# Patient Record
Sex: FEMALE | Race: WHITE | URBAN - NONMETROPOLITAN AREA
[De-identification: names, ages, dates, MRNs, and addresses within clinical notes are randomized per-mention and may not be internally consistent; named-entity substitution may affect disease eponyms.]

---

## 2017-07-19 ENCOUNTER — OFFICE VISIT (OUTPATIENT)
Dept: FAMILY MEDICINE | Facility: OTHER | Age: 27
End: 2017-07-19
Attending: PHYSICIAN ASSISTANT
Payer: COMMERCIAL

## 2017-07-19 VITALS
OXYGEN SATURATION: 97 % | HEART RATE: 106 BPM | DIASTOLIC BLOOD PRESSURE: 72 MMHG | TEMPERATURE: 99.1 F | BODY MASS INDEX: 39.56 KG/M2 | SYSTOLIC BLOOD PRESSURE: 110 MMHG | HEIGHT: 62 IN | WEIGHT: 215 LBS

## 2017-07-19 DIAGNOSIS — Z71.89 ACP (ADVANCE CARE PLANNING): Chronic | ICD-10-CM

## 2017-07-19 DIAGNOSIS — Z71.84 TRAVEL ADVICE ENCOUNTER: Primary | ICD-10-CM

## 2017-07-19 PROCEDURE — 90471 IMMUNIZATION ADMIN: CPT | Performed by: PHYSICIAN ASSISTANT

## 2017-07-19 PROCEDURE — 99212 OFFICE O/P EST SF 10 MIN: CPT | Mod: 25

## 2017-07-19 PROCEDURE — 90632 HEPA VACCINE ADULT IM: CPT | Performed by: PHYSICIAN ASSISTANT

## 2017-07-19 PROCEDURE — 90472 IMMUNIZATION ADMIN EACH ADD: CPT | Performed by: PHYSICIAN ASSISTANT

## 2017-07-19 PROCEDURE — 99213 OFFICE O/P EST LOW 20 MIN: CPT | Performed by: PHYSICIAN ASSISTANT

## 2017-07-19 PROCEDURE — 90715 TDAP VACCINE 7 YRS/> IM: CPT | Performed by: PHYSICIAN ASSISTANT

## 2017-07-19 RX ORDER — BISMUTH SUBSALICYLATE 262 MG/1
524 TABLET, CHEWABLE ORAL
Qty: 30 TABLET | Refills: 1 | Status: SHIPPED | OUTPATIENT
Start: 2017-07-19

## 2017-07-19 RX ORDER — CIPROFLOXACIN 500 MG/1
500 TABLET, FILM COATED ORAL 2 TIMES DAILY
Qty: 20 TABLET | Refills: 0 | Status: SHIPPED | OUTPATIENT
Start: 2017-07-19

## 2017-07-19 ASSESSMENT — ANXIETY QUESTIONNAIRES
2. NOT BEING ABLE TO STOP OR CONTROL WORRYING: NOT AT ALL
6. BECOMING EASILY ANNOYED OR IRRITABLE: NOT AT ALL
IF YOU CHECKED OFF ANY PROBLEMS ON THIS QUESTIONNAIRE, HOW DIFFICULT HAVE THESE PROBLEMS MADE IT FOR YOU TO DO YOUR WORK, TAKE CARE OF THINGS AT HOME, OR GET ALONG WITH OTHER PEOPLE: NOT DIFFICULT AT ALL
4. TROUBLE RELAXING: NOT AT ALL
5. BEING SO RESTLESS THAT IT IS HARD TO SIT STILL: NOT AT ALL
GAD7 TOTAL SCORE: 0
7. FEELING AFRAID AS IF SOMETHING AWFUL MIGHT HAPPEN: NOT AT ALL
3. WORRYING TOO MUCH ABOUT DIFFERENT THINGS: NOT AT ALL
1. FEELING NERVOUS, ANXIOUS, OR ON EDGE: NOT AT ALL

## 2017-07-19 ASSESSMENT — PAIN SCALES - GENERAL: PAINLEVEL: NO PAIN (0)

## 2017-07-19 NOTE — PROGRESS NOTES
SUBJECTIVE:                                                    Jenn Puentes is a 26 year old female who presents to clinic today for the following health issues:        Traveling       Duration: leaving being of September     Description (location/character/radiation): patient will be taking a trip to [place] and is wondering if any vaccines are recommend  and if so would like to today     Intensity:  Patient states no pain today and no pervious cold or URI illness     Accompanying signs and symptoms: none     History (similar episodes/previous evaluation): None    Precipitating or alleviating factors: None    Therapies tried and outcome: None         Dermatology       Duration: ongoing for years     Description (location/character/radiation): on back     Intensity:  mild    Accompanying signs and symptoms: patient states not painful     History (similar episodes/previous evaluation): family history of skin tags     Precipitating or alleviating factors: None    Therapies tried and outcome: None         Problem list and histories reviewed & adjusted, as indicated.  Additional history: as documented    Patient Active Problem List   Diagnosis     NO ACTIVE PROBLEMS     ACP (advance care planning)     History reviewed. No pertinent surgical history.    Social History   Substance Use Topics     Smoking status: Never Smoker     Smokeless tobacco: Never Used     Alcohol use 0.6 oz/week     1 Glasses of wine per week      Comment: rarely     Family History   Problem Relation Age of Onset     Migraines Mother      Depression Mother      DIABETES Father      Obesity Father          Current Outpatient Prescriptions   Medication Sig Dispense Refill     ciprofloxacin (CIPRO) 500 MG tablet Take 1 tablet (500 mg) by mouth 2 times daily 20 tablet 0     bismuth subsalicylate (PEPTO BISMOL) 262 MG chewable tablet Take 2 tablets (524 mg) by mouth 4 times daily (before meals and nightly) 30 tablet 1     Allergies  "  Allergen Reactions     Sulfa Drugs Rash     Recent Labs   Lab Test  09/24/15   1138   ALT  34   CR  0.70   GFRESTIMATED  >90  Non  GFR Calc     GFRESTBLACK  >90   GFR Calc     POTASSIUM  4.0   TSH  1.36      BP Readings from Last 3 Encounters:   07/19/17 110/72   09/24/15 130/86    Wt Readings from Last 3 Encounters:   07/19/17 215 lb (97.5 kg)   09/24/15 200 lb (90.7 kg)                          Reviewed and updated as needed this visit by clinical staffTobacco  Allergies  Meds  Problems  Med Hx  Surg Hx  Fam Hx  Soc Hx        Reviewed and updated as needed this visit by Provider         ROS:  Constitutional, HEENT, cardiovascular, pulmonary, gi and gu systems are negative, except as otherwise noted.      OBJECTIVE:                                                    /72 (BP Location: Left arm, Patient Position: Chair, Cuff Size: Adult Large)  Pulse 106  Temp 99.1  F (37.3  C) (Tympanic)  Ht 5' 1.5\" (1.562 m)  Wt 215 lb (97.5 kg)  SpO2 97%  Breastfeeding? No  BMI 39.97 kg/m2  Body mass index is 39.97 kg/(m^2).  GENERAL APPEARANCE: healthy, alert and no distress  EYES: Eyes grossly normal to inspection, PERRL and conjunctivae and sclerae normal  HENT: ear canals and TM's normal and nose and mouth without ulcers or lesions  NECK: no adenopathy, no asymmetry, masses, or scars and thyroid normal to palpation  RESP: lungs clear to auscultation - no rales, rhonchi or wheezes  CV: regular rates and rhythm, normal S1 S2, no S3 or S4 and no murmur, click or rub  LYMPHATICS: normal ant/post cervical and supraclavicular nodes  ABDOMEN: soft, nontender, without hepatosplenomegaly or masses and bowel sounds normal  MS: extremities normal- no gross deformities noted  SKIN: scattered lesions on trunk.   NEURO: Normal strength and tone, mentation intact and speech normal  PSYCH: mentation appears normal and affect normal/bright    Diagnostic test results:  Diagnostic Test " Results:  none        ASSESSMENT/PLAN:                                                    1. ACP (advance care planning)  Given and discussed.     2. Travel advice encounter  Discussion on Zika and other diseases reccommended CDC yellow book and did review with them due to low risk updating their current vaccines.   Precautions with water and other things while in [facility]  - HEPATITIS A VACCINE (ADULT)  - ciprofloxacin (CIPRO) 500 MG tablet; Take 1 tablet (500 mg) by mouth 2 times daily  Dispense: 20 tablet; Refill: 0  - TDAP VACCINE (BOOSTRIX)  - bismuth subsalicylate (PEPTO BISMOL) 262 MG chewable tablet; Take 2 tablets (524 mg) by mouth 4 times daily (before meals and nightly)  Dispense: 30 tablet; Refill: 1      See Patient Instructions    CHELY Bautista  [facility]

## 2017-07-19 NOTE — PATIENT INSTRUCTIONS
Thank you for choosing [facility].   I have office hours 8:00 am to 4:30 pm on Monday's, Wednesday's, Thursday's and Friday's. My nurse and I are out of the office every Tuesday.    Following your visit, when your labs and diagnostic testing have returned, I will review then and you will be contacted by my nurse.  If you are on My Chart, you can also view results there.    For refills, notify your pharmacy regarding what you need and the pharmacy will generate a refill request. Do not call my nurse as she is unable to process refill request. Please plan ahead and allow 3-5 days for refill requests.    You will generally receive a reminder call the day prior to your appointment.  If you cannot attend your appointment, please cancel your appointment with as much notice as possible.  If there is a pattern of failure to present for your appointments, I cannot provide consistent, meaningful, ongoing care for you. It is very important to me that you come in for your care, so we can best assist you with your health care needs.    IMPORTANT:  Please note that it is my standard of practice to NOT participate in prescribing ongoing requested Narcotic Analgesic therapy, and/or participate in the prescribing of other controlled substances.  My nurse and I am happy to assist you with the process of referral for alternative pain management as needed, and other treatment modalities including but not limited to:  Physical Therapy, Physical Medicine and Rehab, Counseling, Chiropractic Care, Orthopedic Care, and non-narcotic medication management.     In the event that you need to be seen for emergent concerns and I am out of office,  please see one of my colleagues for acute concerns.  You may also present to  or ER.  I appreciate the opportunity to serve you and look forward to supporting your healthcare needs in the future. Please contact me with any questions or concerns that you may  have.    Sincerely,      Chrissy Mast RN, PA-C

## 2017-07-19 NOTE — NURSING NOTE
"Chief Complaint   Patient presents with     *_* Health Care Directive *_*     declined     traveling     needs vaccines for vacation        Initial /72 (BP Location: Left arm, Patient Position: Chair, Cuff Size: Adult Large)  Pulse 106  Temp 99.1  F (37.3  C) (Tympanic)  Ht 5' 1.5\" (1.562 m)  Wt 215 lb (97.5 kg)  SpO2 97%  Breastfeeding? No  BMI 39.97 kg/m2 Estimated body mass index is 39.97 kg/(m^2) as calculated from the following:    Height as of this encounter: 5' 1.5\" (1.562 m).    Weight as of this encounter: 215 lb (97.5 kg).  Medication Reconciliation: complete   Kamilla Geiger CMA([facility])     "

## 2017-07-19 NOTE — MR AVS SNAPSHOT
After Visit Summary   7/19/2017    Jenn Puentes    MRN: [id number]           Patient Information     Date Of Birth          1990        Visit Information        Provider Department      7/19/2017 3:15 PM Chrissy Mast PA [facility]        Today's Diagnoses     Travel advice encounter    -  1    ACP (advance care planning)          Care Instructions      Thank you for choosing [facility].   I have office hours 8:00 am to 4:30 pm on Monday's, Wednesday's, Thursday's and Friday's. My nurse and I are out of the office every Tuesday.    Following your visit, when your labs and diagnostic testing have returned, I will review then and you will be contacted by my nurse.  If you are on My Chart, you can also view results there.    For refills, notify your pharmacy regarding what you need and the pharmacy will generate a refill request. Do not call my nurse as she is unable to process refill request. Please plan ahead and allow 3-5 days for refill requests.    You will generally receive a reminder call the day prior to your appointment.  If you cannot attend your appointment, please cancel your appointment with as much notice as possible.  If there is a pattern of failure to present for your appointments, I cannot provide consistent, meaningful, ongoing care for you. It is very important to me that you come in for your care, so we can best assist you with your health care needs.    IMPORTANT:  Please note that it is my standard of practice to NOT participate in prescribing ongoing requested Narcotic Analgesic therapy, and/or participate in the prescribing of other controlled substances.  My nurse and I am happy to assist you with the process of referral for alternative pain management as needed, and other treatment modalities including but not limited to:  Physical Therapy, Physical Medicine and Rehab, Counseling, Chiropractic Care, Orthopedic Care, and non-narcotic  "medication management.     In the event that you need to be seen for emergent concerns and I am out of office,  please see one of my colleagues for acute concerns.  You may also present to UC or ER.  I appreciate the opportunity to serve you and look forward to supporting your healthcare needs in the future. Please contact me with any questions or concerns that you may have.    Sincerely,      Chrissy Mast RN, PA-C               Follow-ups after your visit        Who to contact     If you have questions or need follow up information about today's clinic visit or your schedule please contact [facility] [place] directly at [phone].  Normal or non-critical lab and imaging results will be communicated to you by MyChart, letter or phone within 4 business days after the clinic has received the results. If you do not hear from us within 7 days, please contact the clinic through [facility] [place] or phone. If you have a critical or abnormal lab result, we will notify you by phone as soon as possible.  Submit refill requests through [employer] or call your pharmacy and they will forward the refill request to us. Please allow 3 business days for your refill to be completed.          Additional Information About Your Visit        MyChart Information     [employer] gives you secure access to your electronic health record. If you see a primary care provider, you can also send messages to your care team and make appointments. If you have questions, please call your primary care clinic.  If you do not have a primary care provider, please call [phone] and they will assist you.        Care EveryWhere ID     This is your Care EveryWhere ID. This could be used by other organizations to access your [place] medical records  [id number]        Your Vitals Were     Pulse Temperature Height Pulse Oximetry Breastfeeding? BMI (Body Mass Index)    106 99.1  F (37.3  C) (Tympanic) 5' 1.5\" (1.562 m) 97% No 39.97 kg/m2       Blood Pressure " from Last 3 Encounters:   07/19/17 110/72   09/24/15 130/86    Weight from Last 3 Encounters:   07/19/17 215 lb (97.5 kg)   09/24/15 200 lb (90.7 kg)              We Performed the Following     HEPATITIS A VACCINE (ADULT)     TDAP VACCINE (BOOSTRIX)          Today's Medication Changes          These changes are accurate as of: 7/19/17  4:27 PM.  If you have any questions, ask your nurse or doctor.               Start taking these medicines.        Dose/Directions    bismuth subsalicylate 262 MG chewable tablet   Commonly known as:  PEPTO BISMOL   Used for:  Travel advice encounter   Started by:  Chrissy Mast PA        Dose:  524 mg   Take 2 tablets (524 mg) by mouth 4 times daily (before meals and nightly)   Quantity:  30 tablet   Refills:  1       ciprofloxacin 500 MG tablet   Commonly known as:  CIPRO   Used for:  Travel advice encounter   Started by:  Chrissy Mast PA        Dose:  500 mg   Take 1 tablet (500 mg) by mouth 2 times daily   Quantity:  20 tablet   Refills:  0            Where to get your medicines      These medications were sent to [facility] - [facility] - [address]  [address] [place] [address]     Phone:  [phone]     bismuth subsalicylate 262 MG chewable tablet    ciprofloxacin 500 MG tablet                Primary Care Provider Office Phone # Fax #    CHELY Carranza [phone]       [facility] [address]  [place] [address]        Equal Access to Services     [facility] Center[facility] AH: Hadii aad ku hadasho Soomaali, waaxda luqadaha, qaybta kaalmada adeegyada, waxay idiin hayaan bishop nguyen . So [facility] [phone].    ATENCIÓN: Si gurpreetla wendy, tiene a manrique disposición servicios gratuitos de asistencia lingüística. Llame al [phone].    We comply with applicable federal civil rights laws and [place] laws. We do not discriminate on the basis of race, color, national origin, age, disability sex, sexual  orientation or gender identity.            Thank you!     Thank you for choosing [facility] [place]  for your care. Our goal is always to provide you with excellent care. Hearing back from our patients is one way we can continue to improve our services. Please take a few minutes to complete the written survey that you may receive in the mail after your visit with us. Thank you!             Your Updated Medication List - Protect others around you: Learn how to safely use, store and throw away your medicines at www.disposemymeds.org.          This list is accurate as of: 7/19/17  4:27 PM.  Always use your most recent med list.                   Brand Name Dispense Instructions for use Diagnosis    bismuth subsalicylate 262 MG chewable tablet    PEPTO BISMOL    30 tablet    Take 2 tablets (524 mg) by mouth 4 times daily (before meals and nightly)    Travel advice encounter       ciprofloxacin 500 MG tablet    CIPRO    20 tablet    Take 1 tablet (500 mg) by mouth 2 times daily    Travel advice encounter

## 2017-07-20 ASSESSMENT — ANXIETY QUESTIONNAIRES: GAD7 TOTAL SCORE: 0

## 2017-07-20 ASSESSMENT — PATIENT HEALTH QUESTIONNAIRE - PHQ9: SUM OF ALL RESPONSES TO PHQ QUESTIONS 1-9: 0

## 2017-09-27 ENCOUNTER — OFFICE VISIT (OUTPATIENT)
Dept: FAMILY MEDICINE | Facility: OTHER | Age: 27
End: 2017-09-27
Attending: PHYSICIAN ASSISTANT
Payer: COMMERCIAL

## 2017-09-27 VITALS
OXYGEN SATURATION: 98 % | TEMPERATURE: 99.1 F | SYSTOLIC BLOOD PRESSURE: 138 MMHG | RESPIRATION RATE: 18 BRPM | BODY MASS INDEX: 36.32 KG/M2 | HEART RATE: 118 BPM | WEIGHT: 205 LBS | HEIGHT: 63 IN | DIASTOLIC BLOOD PRESSURE: 78 MMHG

## 2017-09-27 DIAGNOSIS — N92.6 IRREGULAR MENSES: Primary | ICD-10-CM

## 2017-09-27 DIAGNOSIS — A09 TRAVELER'S DIARRHEA: ICD-10-CM

## 2017-09-27 LAB
BASOPHILS # BLD AUTO: 0.1 10E9/L (ref 0–0.2)
BASOPHILS NFR BLD AUTO: 0.8 %
CRP SERPL-MCNC: 8.3 MG/L (ref 0–8)
DIFFERENTIAL METHOD BLD: ABNORMAL
EOSINOPHIL # BLD AUTO: 0.2 10E9/L (ref 0–0.7)
EOSINOPHIL NFR BLD AUTO: 1.4 %
ERYTHROCYTE [DISTWIDTH] IN BLOOD BY AUTOMATED COUNT: 13.3 % (ref 10–15)
HCG UR QL: NEGATIVE
HCT VFR BLD AUTO: 40.9 % (ref 35–47)
HGB BLD-MCNC: 14.1 G/DL (ref 11.7–15.7)
IMM GRANULOCYTES # BLD: 0.1 10E9/L (ref 0–0.4)
IMM GRANULOCYTES NFR BLD: 0.5 %
LYMPHOCYTES # BLD AUTO: 3.6 10E9/L (ref 0.8–5.3)
LYMPHOCYTES NFR BLD AUTO: 30 %
MCH RBC QN AUTO: 27.8 PG (ref 26.5–33)
MCHC RBC AUTO-ENTMCNC: 34.5 G/DL (ref 31.5–36.5)
MCV RBC AUTO: 81 FL (ref 78–100)
MONOCYTES # BLD AUTO: 0.8 10E9/L (ref 0–1.3)
MONOCYTES NFR BLD AUTO: 6.8 %
NEUTROPHILS # BLD AUTO: 7.3 10E9/L (ref 1.6–8.3)
NEUTROPHILS NFR BLD AUTO: 60.5 %
NRBC # BLD AUTO: 0 10*3/UL
NRBC BLD AUTO-RTO: 0 /100
PLATELET # BLD AUTO: 367 10E9/L (ref 150–450)
RBC # BLD AUTO: 5.07 10E12/L (ref 3.8–5.2)
TSH SERPL DL<=0.005 MIU/L-ACNC: 1.97 MU/L (ref 0.4–4)
WBC # BLD AUTO: 12.1 10E9/L (ref 4–11)

## 2017-09-27 PROCEDURE — 81025 URINE PREGNANCY TEST: CPT | Mod: ZL | Performed by: PHYSICIAN ASSISTANT

## 2017-09-27 PROCEDURE — 84443 ASSAY THYROID STIM HORMONE: CPT | Mod: ZL | Performed by: PHYSICIAN ASSISTANT

## 2017-09-27 PROCEDURE — 36415 COLL VENOUS BLD VENIPUNCTURE: CPT | Mod: ZL | Performed by: PHYSICIAN ASSISTANT

## 2017-09-27 PROCEDURE — 86140 C-REACTIVE PROTEIN: CPT | Mod: ZL | Performed by: PHYSICIAN ASSISTANT

## 2017-09-27 PROCEDURE — 99212 OFFICE O/P EST SF 10 MIN: CPT

## 2017-09-27 PROCEDURE — 99214 OFFICE O/P EST MOD 30 MIN: CPT | Performed by: PHYSICIAN ASSISTANT

## 2017-09-27 PROCEDURE — 85025 COMPLETE CBC W/AUTO DIFF WBC: CPT | Mod: ZL | Performed by: PHYSICIAN ASSISTANT

## 2017-09-27 RX ORDER — NORGESTIMATE AND ETHINYL ESTRADIOL 7DAYSX3 28
1 KIT ORAL DAILY
Qty: 84 TABLET | Refills: 3 | Status: SHIPPED | OUTPATIENT
Start: 2017-09-27

## 2017-09-27 ASSESSMENT — ANXIETY QUESTIONNAIRES
5. BEING SO RESTLESS THAT IT IS HARD TO SIT STILL: NOT AT ALL
4. TROUBLE RELAXING: NOT AT ALL
3. WORRYING TOO MUCH ABOUT DIFFERENT THINGS: NOT AT ALL
GAD7 TOTAL SCORE: 0
6. BECOMING EASILY ANNOYED OR IRRITABLE: NOT AT ALL
2. NOT BEING ABLE TO STOP OR CONTROL WORRYING: NOT AT ALL
IF YOU CHECKED OFF ANY PROBLEMS ON THIS QUESTIONNAIRE, HOW DIFFICULT HAVE THESE PROBLEMS MADE IT FOR YOU TO DO YOUR WORK, TAKE CARE OF THINGS AT HOME, OR GET ALONG WITH OTHER PEOPLE: NOT DIFFICULT AT ALL
1. FEELING NERVOUS, ANXIOUS, OR ON EDGE: NOT AT ALL
7. FEELING AFRAID AS IF SOMETHING AWFUL MIGHT HAPPEN: NOT AT ALL

## 2017-09-27 ASSESSMENT — PATIENT HEALTH QUESTIONNAIRE - PHQ9: SUM OF ALL RESPONSES TO PHQ QUESTIONS 1-9: 0

## 2017-09-27 ASSESSMENT — PAIN SCALES - GENERAL: PAINLEVEL: NO PAIN (0)

## 2017-09-27 NOTE — NURSING NOTE
"Chief Complaint   Patient presents with     Derm Problem     bilateral rash     Mentral issues     Flu Shot       Initial /78 (BP Location: Left arm, Patient Position: Sitting, Cuff Size: Adult Regular)  Pulse 118  Temp 99.1  F (37.3  C) (Tympanic)  Resp 18  Ht 5' 3\" (1.6 m)  Wt 205 lb (93 kg)  SpO2 98%  BMI 36.31 kg/m2 Estimated body mass index is 36.31 kg/(m^2) as calculated from the following:    Height as of this encounter: 5' 3\" (1.6 m).    Weight as of this encounter: 205 lb (93 kg).  Medication Reconciliation: complete   Colleen Gómez MA  "

## 2017-09-27 NOTE — MR AVS SNAPSHOT
After Visit Summary   9/27/2017    Jenn Puentes    MRN: [id number]           Patient Information     Date Of Birth          1990        Visit Information        Provider Department      9/27/2017 3:15 PM Chrissy Mast PA [facility] [place]        Today's Diagnoses     Irregular menses    -  1    Traveler's diarrhea          Care Instructions                 Traveler's Diarrhea  What is traveler's diarrhea?   Traveler's diarrhea is a sudden intestinal infection that you may get when you travel to another country. Other names for this problem are gastroenteritis, Shelbiana's revenge, turista, or the GI trots.   Up to half of the people who travel internationally get traveler's diarrhea. High-risk areas include some parts of [place], [place], the [place], and [place]. Problems with the water supply and sanitation facilities are more likely in these areas.   How does it occur?   Traveler's diarrhea occurs when you have food, ice, water, or other drinks that contain germs from human or animal bowel movements. The germs may be in cooked or uncooked food. The germs may be a virus, parasite, or bacteria.   Escherichia coli (E. coli) bacteria are often a cause of traveler's diarrhea. E. coli bacteria are normally found in the human intestine. There are many varieties of E. coli bacteria. Usually your body becomes used to the E. coli in your environment and the bacteria do not cause problems. However, exposure to new varieties of E. coli in new places may cause diarrhea.   Sometimes diarrhea while you are traveling is caused by the stress of traveling, jet lag, a different diet, or other things, like stomach flu.   What are the symptoms?   You may have the following symptoms:   loose stools, as many as 3 to 10 a day   stomach cramps   bloating and gas   nausea and vomiting   fever   weakness   headache (sometimes).   How is it diagnosed?   Your healthcare provider will ask about your  symptoms, including:   the amount of diarrhea   if you also have blood or mucus in your stool,   if you are having a lot of gas   if you have had vomiting, nausea, high fever, or weight loss.   Your provider will also ask about your travels:   where you have been   if you drank well water   if you were camping and drank water from streams   what food or drinks you have had.   Your provider will also ask about any medicines you may have used.   Your provider will examine you. A sample of bowel movement may be tested to look for signs of infection and to try to identify the germ. You may also have blood tests. These tests help find what is causing the diarrhea.   How is it treated?   You may become dehydrated by the diarrhea. Dehydration happens when your body loses more fluids and salts than it takes in. Dehydration can cause serious problems. It is very important to try to prevent it.   To replace lost fluids and salts, you can make a drink with packets of oral rehydration salts. You can buy the packets at a drugstore. You can also make a rehydration solution by mixin quart or liter of clean water (boil the water 5 minutes if you are not sure it is safe to drink)   2 tablespoons of sugar   1/4 teaspoon salt   1/4 teaspoon of baking soda.   Or you can buy a solution that is already made. One brand is Pedialyte.   Drinking other nonalcoholic drinks made with clean water (boiled or bottled) will also help prevent dehydration, but you may not get all the salts you need. Avoid using ice (especially if you are still out of the [place]), unless you know it's made from boiled or bottled water. Try to drink at least 8 ounces of fluid for each watery stool you have.   Taking bismuth subsalicylate (for example, Pepto-Bismol) 4 times a day may help prevent or treat traveler's diarrhea. Do not take it longer than 3 weeks. You do not need a prescription to get this medicine, but it can have some serious interactions with other  medicines. Check with your healthcare provider before you leave on your trip about using it. You should not use it if:   You are taking other medicines that interact with it.   You are allergic to aspirin.   You are pregnant.   Be cautious about taking antidiarrheal medicines. Nonprescription medicines such as loperamide (sold as Imodium and other trade names) or the prescription medicine Lomotil can make you sicker, especially if the diarrhea is bloody. Do not use these medicines every day to control diarrhea. They can keep the germs causing the diarrhea in the intestine. Do not give antidiarrheal medicine to small children.   See a healthcare provider as soon as possible if you have:   a fever of 101.5?F (38.6?C) or higher   blood in your diarrhea   symptoms that last more than 48 hours   severe vomiting or diarrhea.   Do not try to treat these serious symptoms on your own.   How long will the effects last?   Traveler's diarrhea usually does not last long. It often stops without treatment in 1 to 5 days. Rarely, it lasts 2 to 3 weeks.   How can I take care of myself?   If you are traveling to a place where you think you might get traveler's diarrhea:   Talk to your healthcare provider about your plans.   Take several packets of oral rehydration salts with you.   Carry a few Kaopectate, Imodium, or Lomotil tablets with you for emergencies (for example, to avoid toilet accidents while you are on an airplane).   If you get diarrhea:   You may want to let your bowel rest for a few hours by drinking only clear liquids such as water, weak tea, broth, apple juice, or sports drinks or other oral rehydrating solutions. All of these drinks should be made with boiled or sealed, bottled water. You may also drink soft drinks without caffeine (such as 7 UP) after letting them lose some of their carbonation (go flat). Make sure you drink often so that you do not become dehydrated. Suck on ice chips or Popsicles if you feel too  nauseated to drink fluids.   It is OK to keep eating as long as it does not seem to worsen the diarrhea or stomach cramps. Foods that are easiest to digest are soft starchy foods, such as bananas, cooked cereal, rice, potatoes, plain noodles, plain gelatin, toast or bread, and applesauce. Avoid milk products for a few days. Return to your normal diet after 2 or 3 days, but for several days avoid fresh fruit (other than bananas), alcohol, greasy or fatty foods such as cheeseburgers or hanley, spicy foods, and most fresh vegetables. Cooked carrots, potatoes, and squash are fine. If the diarrhea seems to get worse after you eat, stop eating for a few hours and drink just clear liquids. This will give your bowel a rest.   How can I prevent traveler's diarrhea?   Follow these guidelines:   Do not drink untreated water. This includes avoiding ice cubes in drinks.   If you are camping or won't be where you can buy bottled water, bring a way to purify water, such as a filter or purifier, chlorine or iodine tablets, or a pot and stove for boiling water. If you need to buy a water filter or purifier, buy one that can filter out organisms as small as the ones that cause giardiasis, cholera, and amoebic diarrhea.   Carry a liter of purified water.   Avoid food and drinks from street vendors.   Eat only foods that are cooked and still hot, or fruits and vegetables that you peel yourself.   Do not eat raw or partially cooked fish or shellfish, including such dishes as ceviche. Fully cooked fish and shellfish are safe.   Brushing your teeth with toothpaste and untreated water is usually safe. Most toothpastes contain antibacterial substances. Do not swallow the water.   Carbonated water and soft drinks, bottled water, wine, and beer are usually safe without ice. Do not add ice that has been made from tap water.   Avoid uncooked dairy products.   You may discuss with your healthcare provider the pros and cons of taking antibiotics  with you on your trip. Most current recommendations are to start antibiotics only if you have diarrhea. Doxycycline, Bactrim, Septra, and ciprofloxacin (Cipro) have been used in the past. However, bacteria are becoming resistant to these medicines. Your provider may prescribe other medicines. The usual antibiotic prescription is for 3 days only. The medicines may cause side effects, including an increased risk of sunburn and allergic reactions. Ask your provider about side effects.     Published by [employer].  This content is reviewed periodically and is subject to change as new health information becomes available. The information is intended to inform and educate and is not a replacement for medical evaluation, advice, diagnosis or treatment by a healthcare professional.   Developed by [employer].   ? 2010 [employer] and/or its affiliates. All Rights Reserved.   [employer]   Clinical [employer] 2011                Follow-ups after your visit        Who to contact     If you have questions or need follow up information about today's clinic visit or your schedule please contact [facility] directly at [phone].  Normal or non-critical lab and imaging results will be communicated to you by Fusion Gar[employer]hart, letter or phone within 4 business days after the clinic has received the results. If you do not hear from us within 7 days, please contact the clinic through Badgevillet or phone. If you have a critical or abnormal lab result, we will notify you by phone as soon as possible.  Submit refill requests through Flavourly or call your pharmacy and they will forward the refill request to us. Please allow 3 business days for your refill to be completed.          Additional Information About Your Visit        [employer] People Information     [employer] gives you secure access to your electronic health record. If you see a primary care provider, you can also send messages to your care team and make appointments. If you have  "questions, please call your primary care clinic.  If you do not have a primary care provider, please call [phone] and they will assist you.        Care EveryWhere ID     This is your Care EveryWhere ID. This could be used by other organizations to access your [place] medical records  [id number]        Your Vitals Were     Pulse Temperature Respirations Height Last Period Pulse Oximetry    118 99.1  F (37.3  C) (Tympanic) 18 5' 3\" (1.6 m) 08/20/2017 98%    BMI (Body Mass Index)                   36.31 kg/m2            Blood Pressure from Last 3 Encounters:   09/27/17 138/78   07/19/17 110/72   09/24/15 130/86    Weight from Last 3 Encounters:   09/27/17 205 lb (93 kg)   07/19/17 215 lb (97.5 kg)   09/24/15 200 lb (90.7 kg)              We Performed the Following     CBC with platelets and differential     CRP, inflammation     HCG qualitative urine - CSC and Range     TSH with free T4 reflex          Today's Medication Changes          These changes are accurate as of: 9/27/17  4:52 PM.  If you have any questions, ask your nurse or doctor.               Start taking these medicines.        Dose/Directions    norgestim-eth estrad triphasic 0.18/0.215/0.25 MG-35 MCG per tablet   Commonly known as:  TRINESSA (28)   Used for:  Irregular menses   Started by:  Chrissy Mast PA        Dose:  1 tablet   Take 1 tablet by mouth daily   Quantity:  84 tablet   Refills:  3            Where to get your medicines      These medications were sent to [facility] - [facility] - [address]  [address] [place] [address]     Phone:  [phone]     norgestim-eth estrad triphasic 0.18/0.215/0.25 MG-35 MCG per tablet                Primary Care Provider Office Phone # Fax #    CHELY Carranza [phone]       [facility] [address]  [place] [address]        Equal Access to Services     TERRENCE CHONG AH: Hadii margie Patel, waaxda [facility], ybta " edward grullonenrique nguyen ah. Janeen [facility] [phone].    ATENCIÓN: Si turner nguyen, tiene a manrique disposición servicios gratuitos de asistencia lingüística. Tom al [phone].    We comply with applicable federal civil rights laws and [place] laws. We do not discriminate on the basis of race, color, national origin, age, disability sex, sexual orientation or gender identity.            Thank you!     Thank you for choosing [facility]  for your care. Our goal is always to provide you with excellent care. Hearing back from our patients is one way we can continue to improve our services. Please take a few minutes to complete the written survey that you may receive in the mail after your visit with us. Thank you!             Your Updated Medication List - Protect others around you: Learn how to safely use, store and throw away your medicines at www.disposemymeds.org.          This list is accurate as of: 9/27/17  4:52 PM.  Always use your most recent med list.                   Brand Name Dispense Instructions for use Diagnosis    bismuth subsalicylate 262 MG chewable tablet    PEPTO BISMOL    30 tablet    Take 2 tablets (524 mg) by mouth 4 times daily (before meals and nightly)    Travel advice encounter       ciprofloxacin 500 MG tablet    CIPRO    20 tablet    Take 1 tablet (500 mg) by mouth 2 times daily    Travel advice encounter       norgestim-eth estrad triphasic 0.18/0.215/0.25 MG-35 MCG per tablet    TRINESSA (28)    84 tablet    Take 1 tablet by mouth daily    Irregular menses

## 2017-09-27 NOTE — PROGRESS NOTES
SUBJECTIVE:   Jenn Puentes is a 27 year old female who presents to clinic today for the following health issues:      Rash      Duration: Sept. 15 th, 2017    Description  Location: bilateral hands   Itching: no    Intensity:  mild    Accompanying signs and symptoms: Had itching but it has stopped.    History (similar episodes/previous evaluation): None    Precipitating or alleviating factors:  New exposures:  None and soaps :bubble bath at hotel   Recent travel: YES- [place]     Therapies tried and outcome: none    Vaginal Bleeding (Dysmenorrhea)      Onset: A little over month, has been spotting since last period on 08/20/07    Description:  Duration of bleeding episodes: spotting    Frequency between periods: Never had regular periods, can come earlier or later  Describe bleeding/flow:   Clots: YES  Number of pads/hour: 1  Cramping: N/A    Intensity:  N/A    Accompanying signs and symptoms: bleeding, worse after intercourse    History (similar episodes/previous evaluation): None    Precipitating or alleviating factors: None    Therapies tried and outcome: None              Problem list and histories reviewed & adjusted, as indicated.  Additional history: as documented    Patient Active Problem List   Diagnosis     NO ACTIVE PROBLEMS     ACP (advance care planning)     History reviewed. No pertinent surgical history.    Social History   Substance Use Topics     Smoking status: Never Smoker     Smokeless tobacco: Never Used     Alcohol use 0.6 oz/week     1 Glasses of wine per week      Comment: rarely     Family History   Problem Relation Age of Onset     Migraines Mother      Depression Mother      DIABETES Father      Obesity Father          Current Outpatient Prescriptions   Medication Sig Dispense Refill     bismuth subsalicylate (PEPTO BISMOL) 262 MG chewable tablet Take 2 tablets (524 mg) by mouth 4 times daily (before meals and nightly) 30 tablet 1     ciprofloxacin (CIPRO) 500 MG tablet  "Take 1 tablet (500 mg) by mouth 2 times daily (Patient not taking: Reported on 9/27/2017) 20 tablet 0     Allergies   Allergen Reactions     Sulfa Drugs Rash     BP Readings from Last 3 Encounters:   09/27/17 138/78   07/19/17 110/72   09/24/15 130/86    Wt Readings from Last 3 Encounters:   09/27/17 205 lb (93 kg)   07/19/17 215 lb (97.5 kg)   09/24/15 200 lb (90.7 kg)                        Reviewed and updated as needed this visit by clinical staffTobacco  Allergies  Meds  Med Hx  Surg Hx  Fam Hx  Soc Hx      Reviewed and updated as needed this visit by Provider         ROS:  Constitutional, HEENT, cardiovascular, pulmonary, gi and gu systems are negative, except as otherwise noted.      OBJECTIVE:                                                    /78 (BP Location: Left arm, Patient Position: Sitting, Cuff Size: Adult Regular)  Pulse 118  Temp 99.1  F (37.3  C) (Tympanic)  Resp 18  Ht 5' 3\" (1.6 m)  Wt 205 lb (93 kg)  LMP 08/20/2017  SpO2 98%  BMI 36.31 kg/m2  Body mass index is 36.31 kg/(m^2).  GENERAL APPEARANCE: healthy, alert and no distress  ABDOMEN: soft, nontender, without hepatosplenomegaly or masses and bowel sounds normal   (female): external labia without lesions rectum is clear.  Vaginal walls are rugated and os is midline.  showing bloody bright red discharge from the os.  No odor or tenderness. Ovaries were not palpated.   SKIN: no suspicious lesions or rashes  PSYCH: mentation appears normal and affect normal/bright    Diagnostic test results:  Diagnostic Test Results:  Results for orders placed or performed in visit on 09/27/17 (from the past 24 hour(s))   TSH with free T4 reflex   Result Value Ref Range    TSH 1.97 0.40 - 4.00 mU/L   CBC with platelets and differential   Result Value Ref Range    WBC 12.1 (H) 4.0 - 11.0 10e9/L    RBC Count 5.07 3.8 - 5.2 10e12/L    Hemoglobin 14.1 11.7 - 15.7 g/dL    Hematocrit 40.9 35.0 - 47.0 %    MCV 81 78 - 100 fl    MCH 27.8 26.5 - " 33.0 pg    MCHC 34.5 31.5 - 36.5 g/dL    RDW 13.3 10.0 - 15.0 %    Platelet Count 367 150 - 450 10e9/L    Diff Method Automated Method     % Neutrophils 60.5 %    % Lymphocytes 30.0 %    % Monocytes 6.8 %    % Eosinophils 1.4 %    % Basophils 0.8 %    % Immature Granulocytes 0.5 %    Nucleated RBCs 0 0 /100    Absolute Neutrophil 7.3 1.6 - 8.3 10e9/L    Absolute Lymphocytes 3.6 0.8 - 5.3 10e9/L    Absolute Monocytes 0.8 0.0 - 1.3 10e9/L    Absolute Eosinophils 0.2 0.0 - 0.7 10e9/L    Absolute Basophils 0.1 0.0 - 0.2 10e9/L    Abs Immature Granulocytes 0.1 0 - 0.4 10e9/L    Absolute Nucleated RBC 0.0    CRP, inflammation   Result Value Ref Range    CRP Inflammation 8.3 (H) 0.0 - 8.0 mg/L   HCG qualitative urine - CSC and Range   Result Value Ref Range    HCG Qual Urine Negative NEG^Negative          ASSESSMENT/PLAN:                                                    1. Irregular menses  She will be placed on contraception as her hcg.   - TSH with free T4 reflex  - CBC with platelets and differential  - HCG qualitative urine - CSC and Range    2. Traveler's diarrhea  She is up just a little was in [place] and now has this diarrhea.  Will start he Cipro I gave her and also will take Acidophilous.   She will take 2 per day. See us back if this is not getting better.     - CRP, inflammation      See Patient Instructions    CHELY Bautista  [facility] [place]

## 2017-09-27 NOTE — PATIENT INSTRUCTIONS
Traveler's Diarrhea  What is traveler's diarrhea?   Traveler's diarrhea is a sudden intestinal infection that you may get when you travel to another country. Other names for this problem are gastroenteritis, [place]'s revenge, turista, or the GI trots.   Up to half of the people who travel internationally get traveler's diarrhea. High-risk areas include some parts of [place], [place], [place], and [place]. Problems with the water supply and sanitation facilities are more likely in these areas.   How does it occur?   Traveler's diarrhea occurs when you have food, ice, water, or other drinks that contain germs from human or animal bowel movements. The germs may be in cooked or uncooked food. The germs may be a virus, parasite, or bacteria.   Escherichia coli (E. coli) bacteria are often a cause of traveler's diarrhea. E. coli bacteria are normally found in the human intestine. There are many varieties of E. coli bacteria. Usually your body becomes used to the E. coli in your environment and the bacteria do not cause problems. However, exposure to new varieties of E. coli in new places may cause diarrhea.   Sometimes diarrhea while you are traveling is caused by the stress of traveling, jet lag, a different diet, or other things, like stomach flu.   What are the symptoms?   You may have the following symptoms:   loose stools, as many as 3 to 10 a day   stomach cramps   bloating and gas   nausea and vomiting   fever   weakness   headache (sometimes).   How is it diagnosed?   Your healthcare provider will ask about your symptoms, including:   the amount of diarrhea   if you also have blood or mucus in your stool,   if you are having a lot of gas   if you have had vomiting, nausea, high fever, or weight loss.   Your provider will also ask about your travels:   where you have been   if you drank well water   if you were camping and drank water from streams   what food or drinks you have had.   Your  provider will also ask about any medicines you may have used.   Your provider will examine you. A sample of bowel movement may be tested to look for signs of infection and to try to identify the germ. You may also have blood tests. These tests help find what is causing the diarrhea.   How is it treated?   You may become dehydrated by the diarrhea. Dehydration happens when your body loses more fluids and salts than it takes in. Dehydration can cause serious problems. It is very important to try to prevent it.   To replace lost fluids and salts, you can make a drink with packets of oral rehydration salts. You can buy the packets at a [employer]. You can also make a rehydration solution by mixin quart or liter of clean water (boil the water 5 minutes if you are not sure it is safe to drink)   2 tablespoons of sugar   1/4 teaspoon salt   1/4 teaspoon of baking soda.   Or you can buy a solution that is already made. One brand is Pedialyte.   Drinking other nonalcoholic drinks made with clean water (boiled or bottled) will also help prevent dehydration, but you may not get all the salts you need. Avoid using ice (especially if you are still out of the [place]), unless you know it's made from boiled or bottled water. Try to drink at least 8 ounces of fluid for each watery stool you have.   Taking bismuth subsalicylate (for example, Pepto-Bismol) 4 times a day may help prevent or treat traveler's diarrhea. Do not take it longer than 3 weeks. You do not need a prescription to get this medicine, but it can have some serious interactions with other medicines. Check with your healthcare provider before you leave on your trip about using it. You should not use it if:   You are taking other medicines that interact with it.   You are allergic to aspirin.   You are pregnant.   Be cautious about taking antidiarrheal medicines. Nonprescription medicines such as loperamide (sold as Imodium and other trade names) or the prescription  medicine Lomotil can make you sicker, especially if the diarrhea is bloody. Do not use these medicines every day to control diarrhea. They can keep the germs causing the diarrhea in the intestine. Do not give antidiarrheal medicine to small children.   See a healthcare provider as soon as possible if you have:   a fever of 101.5?F (38.6?C) or higher   blood in your diarrhea   symptoms that last more than 48 hours   severe vomiting or diarrhea.   Do not try to treat these serious symptoms on your own.   How long will the effects last?   Traveler's diarrhea usually does not last long. It often stops without treatment in 1 to 5 days. Rarely, it lasts 2 to 3 weeks.   How can I take care of myself?   If you are traveling to a place where you think you might get traveler's diarrhea:   Talk to your healthcare provider about your plans.   Take several packets of oral rehydration salts with you.   Carry a few Kaopectate, Imodium, or Lomotil tablets with you for emergencies (for example, to avoid toilet accidents while you are on an airplane).   If you get diarrhea:   You may want to let your bowel rest for a few hours by drinking only clear liquids such as water, weak tea, broth, apple juice, or sports drinks or other oral rehydrating solutions. All of these drinks should be made with boiled or sealed, bottled water. You may also drink soft drinks without caffeine (such as 7 UP) after letting them lose some of their carbonation (go flat). Make sure you drink often so that you do not become dehydrated. Suck on ice chips or Popsicles if you feel too nauseated to drink fluids.   It is OK to keep eating as long as it does not seem to worsen the diarrhea or stomach cramps. Foods that are easiest to digest are soft starchy foods, such as bananas, cooked cereal, rice, potatoes, plain noodles, plain gelatin, toast or bread, and applesauce. Avoid milk products for a few days. Return to your normal diet after 2 or 3 days, but for  several days avoid fresh fruit (other than bananas), alcohol, greasy or fatty foods such as cheeseburgers or hanley, spicy foods, and most fresh vegetables. Cooked carrots, potatoes, and squash are fine. If the diarrhea seems to get worse after you eat, stop eating for a few hours and drink just clear liquids. This will give your bowel a rest.   How can I prevent traveler's diarrhea?   Follow these guidelines:   Do not drink untreated water. This includes avoiding ice cubes in drinks.   If you are camping or won't be where you can buy bottled water, bring a way to purify water, such as a filter or purifier, chlorine or iodine tablets, or a pot and stove for boiling water. If you need to buy a water filter or purifier, buy one that can filter out organisms as small as the ones that cause giardiasis, cholera, and amoebic diarrhea.   Carry a liter of purified water.   Avoid food and drinks from street vendors.   Eat only foods that are cooked and still hot, or fruits and vegetables that you peel yourself.   Do not eat raw or partially cooked fish or shellfish, including such dishes as ceviche. Fully cooked fish and shellfish are safe.   Brushing your teeth with toothpaste and untreated water is usually safe. Most toothpastes contain antibacterial substances. Do not swallow the water.   Carbonated water and soft drinks, bottled water, wine, and beer are usually safe without ice. Do not add ice that has been made from tap water.   Avoid uncooked dairy products.   You may discuss with your healthcare provider the pros and cons of taking antibiotics with you on your trip. Most current recommendations are to start antibiotics only if you have diarrhea. Doxycycline, Bactrim, Septra, and ciprofloxacin (Cipro) have been used in the past. However, bacteria are becoming resistant to these medicines. Your provider may prescribe other medicines. The usual antibiotic prescription is for 3 days only. The medicines may cause side  effects, including an increased risk of sunburn and allergic reactions. Ask your provider about side effects.     Published by [employer].  This content is reviewed periodically and is subject to change as new health information becomes available. The information is intended to inform and educate and is not a replacement for medical evaluation, advice, diagnosis or treatment by a healthcare professional.   Developed by [employer].   ? 2010 [employer] and/or its affiliates. All Rights Reserved.   Copyright   [employer] 2011

## 2017-09-28 ASSESSMENT — ANXIETY QUESTIONNAIRES: GAD7 TOTAL SCORE: 0

## 2017-11-26 ENCOUNTER — HEALTH MAINTENANCE LETTER (OUTPATIENT)
Age: 27
End: 2017-11-26

## 2020-03-02 ENCOUNTER — HEALTH MAINTENANCE LETTER (OUTPATIENT)
Age: 30
End: 2020-03-02

## 2020-12-20 ENCOUNTER — HEALTH MAINTENANCE LETTER (OUTPATIENT)
Age: 30
End: 2020-12-20

## 2021-04-18 ENCOUNTER — HEALTH MAINTENANCE LETTER (OUTPATIENT)
Age: 31
End: 2021-04-18

## 2021-10-03 ENCOUNTER — HEALTH MAINTENANCE LETTER (OUTPATIENT)
Age: 31
End: 2021-10-03

## 2022-05-14 ENCOUNTER — HEALTH MAINTENANCE LETTER (OUTPATIENT)
Age: 32
End: 2022-05-14

## 2022-09-04 ENCOUNTER — HEALTH MAINTENANCE LETTER (OUTPATIENT)
Age: 32
End: 2022-09-04

## 2023-06-03 ENCOUNTER — HEALTH MAINTENANCE LETTER (OUTPATIENT)
Age: 33
End: 2023-06-03